# Patient Record
Sex: FEMALE | Race: WHITE | NOT HISPANIC OR LATINO | Employment: FULL TIME | URBAN - METROPOLITAN AREA
[De-identification: names, ages, dates, MRNs, and addresses within clinical notes are randomized per-mention and may not be internally consistent; named-entity substitution may affect disease eponyms.]

---

## 2017-12-08 ENCOUNTER — TELEPHONE (OUTPATIENT)
Dept: OBSTETRICS AND GYNECOLOGY | Facility: CLINIC | Age: 32
End: 2017-12-08

## 2017-12-08 DIAGNOSIS — Z34.90 PREGNANCY, UNSPECIFIED GESTATIONAL AGE: Primary | ICD-10-CM

## 2017-12-08 NOTE — TELEPHONE ENCOUNTER
----- Message from Nilam Green sent at 12/8/2017 12:19 PM CST -----  Contact: pt    Who Called: pt    Date of Positive Preg Test:11/27    1st day of Last Menstrual Cycle:7/25    List Any Difficulties: no    What Number to Call Back:[phone]    Pt should expect a return call by the end of the day.

## 2017-12-14 ENCOUNTER — TELEPHONE (OUTPATIENT)
Dept: OBSTETRICS AND GYNECOLOGY | Facility: CLINIC | Age: 32
End: 2017-12-14

## 2017-12-14 NOTE — TELEPHONE ENCOUNTER
----- Message from Pallavi Smallwood sent at 12/14/2017 12:07 PM CST -----  Contact: Patient   X _1st Request  _  2nd Request  _  3rd Request    Who:BRIEN SAM [[id number]]    Why:Patient is requesting the appointment for today  be rescheduled I tried but there are no available appointments to have the appointment be rescheduled     What Number to Call Back:[phone]    When to Expect a call back: (Before the end of the day)   -- if call after 3:00 call back will be tomorrow.